# Patient Record
(demographics unavailable — no encounter records)

---

## 2024-12-12 NOTE — PHYSICAL EXAM

## 2025-06-23 NOTE — REVIEW OF SYSTEMS
"Emergency Medicine Observation Attending note    The patient was independently seen and examined by me. The chart, vital signs, and labs were reviewed. The patient's findings were discussed with the NILO on the observation unit, and I agree with the findings of the note and the plan.    25 yo female, admitted to ED OBS after presenting with respiratory complaints, and was ultimately diagnosed with RSV. She c/o a productive cough with wheeze, though no sore throat or nasal congestion. No N/V or abd pain. CT chest showed not PE, though did show diffuse groundglass and nodular opacities scattered throughout the lungs in a peribronchovascular distribution, greatest in the right upper lobe, with prominent mediastinal and hilar adenopathy.  Differential considerations include infection, including atypical  sources of infection, and pulmonary sarcoidosis. She was noted to drop her sats into the high 90s with walking, so was admitted for supportive cares as well as steroids and neb treatments. Yesterday she remained hypoxic at times, even at rest, so required ongoing supplemental O2 as well as nebs. This morning she reports that she's still feeling the same - feeling quite winded at times, particularly with minor activity.     /78   Pulse 99   Temp 97.5  F (36.4  C)   Resp 26   Ht 1.549 m (5' 1\")   Wt 140.6 kg (310 lb)   SpO2 97%   BMI 58.57 kg/m      Exam:  General: awake, alert, appears tachypneic  HEENT: NC/AT  Neck: supple  Lungs: decreased breath sounds throughout, faint exp wheeze at apices bilaterally  Heart: RRR, no M/R/G  Abd: soft, ND/NT  Ext: non-tender, no edema      Assessment/plan:  1. Respiratory infection - RSV positive. CT showed scattered ground glass opacities - discussed with Radiology today who agrees that the findings would be c/w an RSV type infection. She has been receiving steroids and scheduled nebs. Pt still on oxygen this am due to desaturation overnight. Pt tachypneic this am (24 " while I was in the room), and visibly winding while talking. Will attempt to walk and assess sats, but will likely need to transition to inpt this am.      [Negative] : Heme/Lymph

## 2025-06-23 NOTE — PHYSICAL EXAM
